# Patient Record
Sex: FEMALE
[De-identification: names, ages, dates, MRNs, and addresses within clinical notes are randomized per-mention and may not be internally consistent; named-entity substitution may affect disease eponyms.]

---

## 2017-01-30 ENCOUNTER — APPOINTMENT (OUTPATIENT)
Dept: ORTHOPEDIC SURGERY | Facility: CLINIC | Age: 62
End: 2017-01-30

## 2017-01-30 DIAGNOSIS — F17.210 NICOTINE DEPENDENCE, CIGARETTES, UNCOMPLICATED: ICD-10-CM

## 2017-01-30 DIAGNOSIS — Z78.9 OTHER SPECIFIED HEALTH STATUS: ICD-10-CM

## 2017-01-30 DIAGNOSIS — M19.029 PRIMARY OSTEOARTHRITIS, UNSPECIFIED ELBOW: ICD-10-CM

## 2017-01-30 DIAGNOSIS — Z87.09 PERSONAL HISTORY OF OTHER DISEASES OF THE RESPIRATORY SYSTEM: ICD-10-CM

## 2017-01-30 DIAGNOSIS — Z00.00 ENCOUNTER FOR GENERAL ADULT MEDICAL EXAMINATION W/OUT ABNORMAL FINDINGS: ICD-10-CM

## 2017-01-30 RX ORDER — LACTULOSE 10 G/15ML
10 SOLUTION ORAL
Qty: 300 | Refills: 0 | Status: ACTIVE | COMMUNITY
Start: 2016-11-29

## 2017-01-30 RX ORDER — LEVOTHYROXINE SODIUM 0.09 MG/1
88 TABLET ORAL
Qty: 15 | Refills: 0 | Status: ACTIVE | COMMUNITY
Start: 2016-08-24

## 2017-01-30 RX ORDER — LEVOTHYROXINE SODIUM 0.1 MG/1
100 TABLET ORAL
Qty: 15 | Refills: 0 | Status: ACTIVE | COMMUNITY
Start: 2016-08-24

## 2023-08-21 ENCOUNTER — APPOINTMENT (OUTPATIENT)
Dept: RADIOLOGY | Facility: CLINIC | Age: 68
End: 2023-08-21

## 2023-08-21 ENCOUNTER — APPOINTMENT (OUTPATIENT)
Dept: ORTHOPEDIC SURGERY | Facility: CLINIC | Age: 68
End: 2023-08-21
Payer: MEDICARE

## 2023-08-21 ENCOUNTER — OUTPATIENT (OUTPATIENT)
Dept: OUTPATIENT SERVICES | Facility: HOSPITAL | Age: 68
LOS: 1 days | End: 2023-08-21
Payer: MEDICARE

## 2023-08-21 ENCOUNTER — RESULT REVIEW (OUTPATIENT)
Age: 68
End: 2023-08-21

## 2023-08-21 VITALS
DIASTOLIC BLOOD PRESSURE: 80 MMHG | WEIGHT: 134.13 LBS | SYSTOLIC BLOOD PRESSURE: 149 MMHG | BODY MASS INDEX: 24.07 KG/M2 | HEIGHT: 62.5 IN | HEART RATE: 77 BPM | OXYGEN SATURATION: 92 %

## 2023-08-21 DIAGNOSIS — J44.9 CHRONIC OBSTRUCTIVE PULMONARY DISEASE, UNSPECIFIED: ICD-10-CM

## 2023-08-21 DIAGNOSIS — M20.11 HALLUX VALGUS (ACQUIRED), RIGHT FOOT: ICD-10-CM

## 2023-08-21 DIAGNOSIS — M20.41 OTHER HAMMER TOE(S) (ACQUIRED), RIGHT FOOT: ICD-10-CM

## 2023-08-21 PROCEDURE — 73630 X-RAY EXAM OF FOOT: CPT | Mod: 26,RT

## 2023-08-21 PROCEDURE — 99203 OFFICE O/P NEW LOW 30 MIN: CPT

## 2023-08-21 RX ORDER — ALBUTEROL 90 MCG
AEROSOL (GRAM) INHALATION
Refills: 0 | Status: ACTIVE | COMMUNITY

## 2023-08-21 NOTE — IMAGING
[de-identified] : The patient is a well appearing 68 year old appropriate for their stated age.  Patient ambulates with a normal gait. No Antalgic, Broad-based, High-steppage, Spastic Gait Negative straight leg raise.    Effected Foot and Ankle:  Inspection:  Erythema: None  Ecchymosis: None  Abrasions: None Rashes: None Surgical Scars: None  Effusion: None Atrophy: None Deformity: None  Pes [place] Valgus: Negative  Pes Cavus: Negative Hallux Valgus: positive Clawtoe/Hammertoe: 2nd toe  Palpation:  Crepitus: None  Proximal Fibula: Nontender Distal Fibula: Nontender  Medial Malleolus: Nontender  Lateral Malleolus: Nontender  AITFL: Nontender PITFL: Nontender  ATFL: Nontender  CFL: Nontender  Deltoid: Nontender  Calcaneus: Nontender  Talar Head/Neck: Nontender Lateral Process of Talus: Nontender Anterior Facet of Calcaneus: Nontender  Peroneal Tendons: Nontender  Posterior Tibialis: Nontender  Achilles Tendon: Nontender & Intact Achilles Insertion: Nontender Retrocalcaneal Bursa: Nontender  Anterior Capsule: Nontender Subtalar Joint: Nontender Talonavicular Joint: Nontender Calcaneocuboid Joint: Nontender Heel pad: Nontender Medial Tubercle of Calcaneus: Nontender Plantar Fascia: Nontender Midfoot: Nontender  Forefoot: tender 2nd PIP, mildly tender MTP 1st toe Sesamoids: Nontender   ROM:  Ankle Dorsiflexion: 15 degrees  Ankle Plantar Flexion: 35 degrees Eversion: 0 degrees Inversion: 25 degrees 1st MTP Plantarflexion: 35 degrees 1st MTP Dorsiflexion: 30 degrees  Motor:  Dorsiflexion: 5 out of 5  Plantar Flexion: 5 out of 5  Inversion: 5  out of 5  Eversion: 5 out of 5  EHL: 5 out of 5  FHL: 5 out of 5   Provocative Testing:  Anterior Drawer: Negative  Syndesmosis Squeeze Test: Negative  Beltran's Test: Negative  Midfoot Stability/Rotation: Negative  Single Leg Heel Raise: Normal  Midfoot Compression: Negative Mulders Clunk: Negative MTP Lachman: Negative Windlass Mechanism: Nonpainful Axial Grind 1st MTP: Painless  Neurologic Exam:  L4-S1 Sensation: Grossly Intact Tinels: Negative Vascular Exam/Pulses:  Dorsalis Pedis: 2+  Posterior Tibialis: 2+  Capillary Refill: <2 Seconds  Other Exams: None  Pertinent Contralateral Findings: None

## 2023-08-21 NOTE — DISCUSSION/SUMMARY
[de-identified] : Patient seen and examined.  Patient presents today for acute on chronic right forefoot pain.  Based on her history, physical examination, imaging I discussed with her that her symptoms are suggestive to a bunion with crossover toe and associated hammertoe.  I discussed with her treatment options which include nonoperative measures in the form of shoewear modification, first webspace spacer, stiff rigid soled shoes with a rocker-bottom.  Due to the fact that she has a somewhat correctable deformity I discussed with her that surgery would involve correction of her bunion to decrease the crossover strain on her plantar plate followed by a Weil osteotomy and then assessment of her hammertoe to see if it can correct to neutral if it is stuck in that position then another incision to excise the proximal distal aspect of the PIP joint and do a PIP fusion.  I discussed with her that I do not do this surgery and would refer her to one of my partners within the network that does it.  She got the information for [facility].  All questions were asked and answered.  Patient agreement with plan.  Patient will follow up with another provider for surgical management.

## 2023-08-21 NOTE — DISCUSSION/SUMMARY
[de-identified] : Patient seen and examined.  Patient presents today for acute on chronic right forefoot pain.  Based on her history, physical examination, imaging I discussed with her that her symptoms are suggestive to a bunion with crossover toe and associated hammertoe.  I discussed with her treatment options which include nonoperative measures in the form of shoewear modification, first webspace spacer, stiff rigid soled shoes with a rocker-bottom.  Due to the fact that she has a somewhat correctable deformity I discussed with her that surgery would involve correction of her bunion to decrease the crossover strain on her plantar plate followed by a Weil osteotomy and then assessment of her hammertoe to see if it can correct to neutral if it is stuck in that position then another incision to excise the proximal distal aspect of the PIP joint and do a PIP fusion.  I discussed with her that I do not do this surgery and would refer her to one of my partners within the network that does it.  She got the information for [facility].  All questions were asked and answered.  Patient agreement with plan.  Patient will follow up with another provider for surgical management.

## 2023-08-21 NOTE — HISTORY OF PRESENT ILLNESS
[de-identified] : Ms. Blankenship is a very pleasant 68 year-old female right hand dominant , who presents today for an evaluation of a right hammer toe pain. she accidentally bumped  the toe at the age of 12. Daniela, stated that she ignored the pain over the years but in recent times it's becoming unbearable. Date of Injury/Onset: Early twenties  Pain: At Rest: 0/10 With Activity:0/10 Mechanism of injury: This is [not] a Work Related Injury being treated under Worker's Compensation. This is not an athletic injury occurring associated with an interscholastic or organized sports team. Quality of symptoms: Improves with: When not walking. Worse with: When wearing shoes without a bandage/tape Prior treatment: Prior Imaging: Reports Available For Review Today: Out of work/sport: [no], since [date of injury] School/Sport/Position/Occupation:_ Personal goal: Additional Information: [None] Patient seen and examined.  Patient presents today for evaluation of acute on chronic right foot forefoot pain.  Patient states her pain is localized to the dorsal aspect of the PIP of the second toe.  Patient states that the pain started years ago and that she has tried extensive nonoperative management in the form of toe taping as well as shoewear modification.  She states that it does not bother her with respect to pain however she notices that there is rubbing over the dorsum of the PIP joint.  Patient denies numbness or tingling right lower extremity.  She has recently had 4 stomach surgeries at [place] and [facility] for a knotted intestines.  She has not been seen or evaluated by any doctor or had any formal treatment, therapy injections or otherwise to address this issue.

## 2023-08-21 NOTE — HISTORY OF PRESENT ILLNESS
[de-identified] : Ms. Blankenship is a very pleasant 68 year-old female right hand dominant , who presents today for an evaluation of a right hammer toe pain. she accidentally bumped  the toe at the age of 12. Daniela, stated that she ignored the pain over the years but in recent times it's becoming unbearable. Date of Injury/Onset: Early twenties  Pain: At Rest: 0/10 With Activity:0/10 Mechanism of injury: This is [not] a Work Related Injury being treated under Worker's Compensation. This is not an athletic injury occurring associated with an interscholastic or organized sports team. Quality of symptoms: Improves with: When not walking. Worse with: When wearing shoes without a bandage/tape Prior treatment: Prior Imaging: Reports Available For Review Today: Out of work/sport: [no], since [date of injury] School/Sport/Position/Occupation:_ Personal goal: Additional Information: [None] Patient seen and examined.  Patient presents today for evaluation of acute on chronic right foot forefoot pain.  Patient states her pain is localized to the dorsal aspect of the PIP of the second toe.  Patient states that the pain started years ago and that she has tried extensive nonoperative management in the form of toe taping as well as shoewear modification.  She states that it does not bother her with respect to pain however she notices that there is rubbing over the dorsum of the PIP joint.  Patient denies numbness or tingling right lower extremity.  She has recently had 4 stomach surgeries at [facility] and [facility] for a knotted intestines.  She has not been seen or evaluated by any doctor or had any formal treatment, therapy injections or otherwise to address this issue.

## 2023-08-21 NOTE — IMAGING
[de-identified] : The patient is a well appearing 68 year old appropriate for their stated age.  Patient ambulates with a normal gait. No Antalgic, Broad-based, High-steppage, Spastic Gait Negative straight leg raise.    Effected Foot and Ankle:  Inspection:  Erythema: None  Ecchymosis: None  Abrasions: None Rashes: None Surgical Scars: None  Effusion: None Atrophy: None Deformity: None  Pes [place] Valgus: Negative  Pes Cavus: Negative Hallux Valgus: positive Clawtoe/Hammertoe: 2nd toe  Palpation:  Crepitus: None  Proximal Fibula: Nontender Distal Fibula: Nontender  Medial Malleolus: Nontender  Lateral Malleolus: Nontender  AITFL: Nontender PITFL: Nontender  ATFL: Nontender  CFL: Nontender  Deltoid: Nontender  Calcaneus: Nontender  Talar Head/Neck: Nontender Lateral Process of Talus: Nontender Anterior Facet of Calcaneus: Nontender  Peroneal Tendons: Nontender  Posterior Tibialis: Nontender  Achilles Tendon: Nontender & Intact Achilles Insertion: Nontender Retrocalcaneal Bursa: Nontender  Anterior Capsule: Nontender Subtalar Joint: Nontender Talonavicular Joint: Nontender Calcaneocuboid Joint: Nontender Heel pad: Nontender Medial Tubercle of Calcaneus: Nontender Plantar Fascia: Nontender Midfoot: Nontender  Forefoot: tender 2nd PIP, mildly tender MTP 1st toe Sesamoids: Nontender   ROM:  Ankle Dorsiflexion: 15 degrees  Ankle Plantar Flexion: 35 degrees Eversion: 0 degrees Inversion: 25 degrees 1st MTP Plantarflexion: 35 degrees 1st MTP Dorsiflexion: 30 degrees  Motor:  Dorsiflexion: 5 out of 5  Plantar Flexion: 5 out of 5  Inversion: 5  out of 5  Eversion: 5 out of 5  EHL: 5 out of 5  FHL: 5 out of 5   Provocative Testing:  Anterior Drawer: Negative  Syndesmosis Squeeze Test: Negative  Beltran's Test: Negative  Midfoot Stability/Rotation: Negative  Single Leg Heel Raise: Normal  Midfoot Compression: Negative Mulders Clunk: Negative MTP Lachman: Negative Windlass Mechanism: Nonpainful Axial Grind 1st MTP: Painless  Neurologic Exam:  L4-S1 Sensation: Grossly Intact Tinels: Negative Vascular Exam/Pulses:  Dorsalis Pedis: 2+  Posterior Tibialis: 2+  Capillary Refill: <2 Seconds  Other Exams: None  Pertinent Contralateral Findings: None

## 2023-08-21 NOTE — DATA REVIEWED
[FreeTextEntry1] : 3 views right foot performed at [facility]: These films were independently interpreted by myself.  There is evidence of a bunion with no significant MTP arthrosis, a second hammertoe as well as a second toe crossover sign.  Leola deformity appreciated on lateral.

## 2023-08-21 NOTE — DATA REVIEWED
[FreeTextEntry1] : 3 views right foot performed at [facility]: These films were independently interpreted by myself.  There is evidence of a bunion with no significant MTP arthrosis, a second hammertoe as well as a second toe crossover sign.  Leola deformity appreciated on lateral.